# Patient Record
(demographics unavailable — no encounter records)

---

## 2024-11-20 NOTE — HISTORY OF PRESENT ILLNESS
[No Pertinent Cardiac History] : no history of aortic stenosis, atrial fibrillation, coronary artery disease, recent myocardial infarction, or implantable device/pacemaker [Atrial Fibrillation] : atrial fibrillation [Coronary Artery Disease] : coronary artery disease [Recent Myocardial Infarction] : recent myocardial infarction [Implantable Device/Pacemaker] : no implantable device/pacemaker [Asthma] : asthma [COPD] : no COPD [Sleep Apnea] : no sleep apnea [Smoker] : not a smoker [No Adverse Anesthesia Reaction] : no adverse anesthesia reaction in self or family member [Family Member] : no family member with adverse anesthesia reaction/sudden death [Self] : no previous adverse anesthesia reaction [Chronic Anticoagulation] : chronic anticoagulation [Diabetes] : diabetes [(Patient denies any chest pain, claudication, dyspnea on exertion, orthopnea, palpitations or syncope)] : Patient denies any chest pain, claudication, dyspnea on exertion, orthopnea, palpitations or syncope [Moderate (4-6 METs)] : Moderate (4-6 METs) [FreeTextEntry1] : l4-l5  laminectomy and fusion  [FreeTextEntry2] : 11/26/2024 [FreeTextEntry4] : 57 year  old female presents for pre op clearance- moderate- severe stenosis l4-l5  PMH: antibody mediated autoimmune hemolytic anemia. - sees hematology  hypothyroid, cardiac stent, MI CAD, DM 2, hypotension CAD - stent- hx of afib, ,  PSH:  S/p surgery for carpal tunnel and lysis of scar tissue 3/29/24 c sect,  hx of VTE  apl Allergies: CODEINE, LATEX, VIT e, BACITRACIN NEOSPORIN, metal Meds:  Prednisone 5mg and azathioprine 50mg once a day crestor, hs, lt4, asa  bactrum, - ID azathio,  asa  eliquis metorplol  bid     folic acid  probiotic  prevagen  calcium  d3  multiv  azelastine  flonase  snoring

## 2025-02-13 NOTE — HISTORY OF PRESENT ILLNESS
[de-identified] : 57 year old female with PMH of hypothyroidism, HTN, DM type 2, hemolytic anemia, CAD s/p stent x1, DVT (2022), and pAF s/p L3-L5 posterior spinal decompression and fusion 11/26 c/b complete occlusive thrombosis of the left common iliac vein and associate compartment syndrome. s/p RTOR for LLE thrombectomy, IVC filter placement, L thigh and calf fasciotomies on 11/27 further c/b intraoperative cardiac arrest requiring multiple rounds of CPR from presumed PE. Additional code 11/28 requiring 2 rounds of CPR and push of bicarb + mg. Patient with LLE NV exam changes overnight iso increased leg swelling w/ drainage, rising lactate, and increasing pressor requirements c/f sepsis 12/13. Patient brought emergently to OR now s/p open L AKA w/ wound vac placement 12/14. Now s/p tracheostomy, but course c/b epidural hematoma pending OR washout 1/8. Hematology reconsulted for AC recs.   #Warm AIHA #DVT/PE Pt's warm autoimmune hemolytic anemia (Dr. Gillespie) has been refractory to multiple therapies outpatient, including prednisone, rituximab, splenectomy, imuran + prednisone. Most recently, outpatient regimen consisted of prednisone 5mg (increased to 10mg daily for her upcoming spine surgery) and azathioprine 50mg daily. Hypercoagulable workup will have to be repeated outpatient to accurately diagnose an underlying hypercoagulable state. - Negative Silica test, beta 2 glycoprotein, cardiolipin - DRVVT +ve (1.49). Lupus anticoagulation test (silica and DRVVT) can be affected by anticoagulation. Patient was started on Heparin drip around 5 pm on 11/27/24 and sample for DRVVT was collected around 5 am on 11/28/24.   #Supratherapeutic INR - Started bridge to warfarin given +DRVVT (although may be unreliable in setting of heparin infusoin when lab was collected) - Warfarin held since 12/6 PM given suprathereapeutic INR which fredrick to peak 3.97 (12/10) - s/p Vit K 10mg x2 in SICU with improvement in INR, s/p trach and peg 12/16/2024  - Per neurology: near complete quadriplegia post multiple cardiac arrests and sedation with intact cognition, possibly due to myelopathy, concern for spinal infarct.  - MR showing patchy foci of increased intensity in cervical spine concerning for infarction as well as long segment posterior epidural hematoma extending from C5 to L3 with mass effect on posterior thecal sac.   Recommendations: - currently on therapeutic lovenox. Once no further procedures planned d/w patient's primary hematologist can transition the patient to a DOAC as patient does not carry a diagnosis of APLS at this time.  - pRBC transfusions as necessary to maintain Hb>7 - Recommend platelet goal of > while critically bleeding, otherwise 50 - S/p stress-dose steroids for critical illness, and now transitioned back to home prednisone 10mg daily - Will hold her home azathioprine (Imuran) 50mg daily. Will discuss on discharge, if plan to resume.  - Will repeat hypercoag workup outpatient, patient to follow up with Dr. Gillespie (primary hematologist) outpatient on discharge. -Please start folic acid 1 mg daily as supportive measure i/s/o hx of hemolysis.

## 2025-07-25 NOTE — PHYSICAL EXAM
[2+] : right 2+ [Ankle Swelling (On Exam)] : present [Ankle Swelling On The Right] : mild [Skin Ulcer] : ulcer [Alert] : alert [Oriented to Person] : oriented to person [Oriented to Place] : oriented to place [Oriented to Time] : oriented to time [Calm] : calm [Please See PDF for Tissue Analytics] : Please See PDF for Tissue Analytics. [de-identified] : Lethargic, alert, well groomed [de-identified] : soft, non tender [de-identified] : Left BKA [de-identified] : multiple

## 2025-07-25 NOTE — PHYSICAL EXAM
[2+] : right 2+ [Ankle Swelling (On Exam)] : present [Ankle Swelling On The Right] : mild [Skin Ulcer] : ulcer [Alert] : alert [Oriented to Person] : oriented to person [Oriented to Place] : oriented to place [Oriented to Time] : oriented to time [Calm] : calm [Please See PDF for Tissue Analytics] : Please See PDF for Tissue Analytics. [de-identified] : Lethargic, alert, well groomed [de-identified] : soft, non tender [de-identified] : Left BKA [de-identified] : multiple

## 2025-07-25 NOTE — HISTORY OF PRESENT ILLNESS
[FreeTextEntry1] : Pt. presents today with her son, pt. went in for elective lumbar laminectomy in november, 2024 at [facility], surgery went well, then post -op her left leg started swelling, compartment syndrome, DVT, s/p fasciotomy, attempted to do thrombectomy, coded 2x on table,, found to have PE, IVC  filter placed,  transferred to ICU, vac was placed, for a whole night vac was not holding pressure, following morning, vac removed, wound found to be black, for possible debridement, unfortunately s/p Left AKA was performed. Was hospitalized from November 2024-Jan. 2025. Then transferred to [facility] rehab. stopped her blood thinners, subsequent right DVT, back to [facility], was found to be in kidney failure. While at [facility] - septic, acute renal failure, developed worsening of sacral wound, worsening of right leg, s/p excisional debridement at bedside by Dr Luz of right posterior calf. Wounds - sacral stage 4, wound bed - areas of pink tissue, areas of slough, necrotic edges, wound injected with lidocaine/epi. before excisional debridement of slough and necrotic tissue, tolerated well. Left BKA site and lateral aspect thigh - clean and pink, wound pulled to surface. Right posterior calf (fasciotomy site) clean, pink, exposed tendon, remains moist. Right heel with 2 wounds, s/p excisional debridement of yellow slough and necrotic scab. right posterior thigh - clean and pink right dorsal foot with irritation most loikely from strap on heel pillow

## 2025-07-25 NOTE — ASSESSMENT
[Verbal] : Verbal [Written] : Written [Patient] : Patient [Good - alert, interested, motivated] : Good - alert, interested, motivated [Verbalizes knowledge/Understanding] : Verbalizes knowledge/understanding [Dressing changes] : dressing changes [Skin Care] : skin care [Pressure relief] : pressure relief [Signs and symptoms of infection] : sign and symptoms of infection [Nutrition] : nutrition [How and When to Call] : how and when to call [Patient responsibility to plan of care] : patient responsibility to plan of care [FreeTextEntry1] : Pt. presents today with her son, pt. went in for elective lumbar laminectomy in november, 2024 at [facility], surgery went well, then post -op her left leg started swelling, compartment syndrome, DVT, s/p fasciotomy, attempted to do thrombectomy, coded 2x on table,, found to have PE, IVC  filter placed,  transferred to ICU, vac was placed, for a whole night vac was not holding pressure, following morning, vac removed, wound found to be black, for possible debridement, unfortunately s/p Left AKA was performed. Was hospitalized from November 2024-Jan. 2025. Then transferred to [facility] rehab. stopped her blood thinners, subsequent right DVT, back to [facility], was found to be in kidney failure. While at [facility] - septic, acute renal failure, developed worsening of sacral wound, worsening of right leg, s/p excisional debridement at bedside by Dr Luz of right posterior calf. Wounds - sacral stage 4, wound bed - areas of pink tissue, areas of slough, necrotic edges, wound injected with lidocaine/epi. before excisional debridement of slough and necrotic tissue, tolerated well. Left BKA site and lateral aspect thigh - clean and pink, wound pulled to surface. Right posterior calf (fasciotomy site) clean, pink, exposed tendon, remains moist. Right heel with 2 wounds, s/p excisional debridement of yellow slough and necrotic scab. right posterior thigh - clean and pink right dorsal foot with irritation most loikely from strap on heel pillow

## 2025-07-25 NOTE — PLAN
[FreeTextEntry1] : 7/23/25 Plan - orders for facility  Right thigh medial aspect, warm, indurated, wrote for facility to monitor offloading with group 2 mattress stephanie 2 packages/day sacral - wash soap and water, wash with dakins, start wound vac, 125 mm/hg, continuous with black foam and bridging Left BKA site- wash with soap and water, rinse, adaptic/aquacel/ben ace, change daily right posterior calf - wash with dakins, exposed tendon - apply hydrogel/adaptic/aquacel/ben, change daily right heel wounds - wash with dakins, aquacel/4x4/cover, change daily, heel pillow right posterior thigh - clean with dakins, aquacel, cover, change daily right foot (dorsal) cavilon daily, dry protective dressing, do not pull starp too tight on heel pillow follow up 2 weeks

## 2025-07-25 NOTE — HISTORY OF PRESENT ILLNESS
[FreeTextEntry1] : Pt. presents today with her son, pt. went in for elective lumbar laminectomy in november, 2024 at [facility], surgery went well, then post -op her left leg started swelling, compartment syndrome, DVT, s/p fasciotomy, attempted to do thrombectomy, coded 2x on table,, found to have PE, IVC  filter placed,  transferred to ICU, vac was placed, for a whole night vac was not holding pressure, following morning, vac removed, wound found to be black, for possible debridement, unfortunately s/p Left AKA was performed. Was hospitalized from November 2024-Jan. 2025. Then transferred to [facility] rehab. stopped her blood thinners, subsequent right DVT, back to [facility], was found to be in kidney failure. While at [facility] - septic, acute renal failure, developed worsening of sacral wound, worsening of right leg, s/p excisional debridement at bedside by Dr Luz of right posterior calf. Wounds - sacral stage 4, wound bed - areas of pink tissue, areas of slough, necrotic edges, wound injected with lidocaine/epi. before excisional debridement of slough and necrotic tissue, tolerated well. Left BKA site and lateral aspect thigh - clean and pink, wound pulled to surface. Right posterior calf (fasciotomy site) clean, pink, exposed tendon, remains moist. Right heel with 2 wounds, s/p excisional debridement of yellow slough and necrotic scab. right posterior thigh - clean and pink right dorsal foot with irritation most loikely from strap on heel pillow

## 2025-07-25 NOTE — REVIEW OF SYSTEMS
[Feeling Poorly] : feeling poorly [Incontinence] : incontinence [Skin Wound] : skin wound [Negative] : Psychiatric [Feeling Tired] : not feeling tired [FreeTextEntry9] : left BKA [de-identified] : multiple wounds [de-identified] : diabetes induced by prednisone

## 2025-07-25 NOTE — REVIEW OF SYSTEMS
[Feeling Poorly] : feeling poorly [Incontinence] : incontinence [Skin Wound] : skin wound [Negative] : Psychiatric [Feeling Tired] : not feeling tired [FreeTextEntry9] : left BKA [de-identified] : multiple wounds [de-identified] : diabetes induced by prednisone

## 2025-07-25 NOTE — ASSESSMENT
[Verbal] : Verbal [Written] : Written [Patient] : Patient [Good - alert, interested, motivated] : Good - alert, interested, motivated [Verbalizes knowledge/Understanding] : Verbalizes knowledge/understanding [Dressing changes] : dressing changes [Skin Care] : skin care [Pressure relief] : pressure relief [Signs and symptoms of infection] : sign and symptoms of infection [Nutrition] : nutrition [How and When to Call] : how and when to call [Patient responsibility to plan of care] : patient responsibility to plan of care [FreeTextEntry1] : Pt. presents today with her son, pt. went in for elective lumbar laminectomy in november, 2024 at [facility], surgery went well, then post -op her left leg started swelling, compartment syndrome, DVT, s/p fasciotomy, attempted to do thrombectomy, coded 2x on table,, found to have PE, IVC  filter placed,  transferred to ICU, vac was placed, for a whole night vac was not holding pressure, following morning, vac removed, wound found to be black, for possible debridement, unfortunately s/p Left AKA was performed. Was hospitalized from November 2024-Jan. 2025. Then transferred to [facility] rehab. stopped her blood thinners, subsequent right DVT, back to [facility], was found to be in kidney failure. While at [facility] - septic, acute renal failure, developed worsening of sacral wound, worsening of right leg, s/p excisional debridement at bedside by Dr Luz of right posterior calf. Wounds - sacral stage 4, wound bed - areas of pink tissue, areas of slough, necrotic edges, wound injected with lidocaine/epi. before excisional debridement of slough and necrotic tissue, tolerated well. Left BKA site and lateral aspect thigh - clean and pink, wound pulled to surface. Right posterior calf (fasciotomy site) clean, pink, exposed tendon, remains moist. Right heel with 2 wounds, s/p excisional debridement of yellow slough and necrotic scab. right posterior thigh - clean and pink right dorsal foot with irritation most loikely from strap on heel pillow

## 2025-07-28 NOTE — DISCUSSION/SUMMARY
[de-identified] : S/P L3-5 fusion in Nov 2024 with me. Hospital course c/b by LLE DVT and compartment syndrome, s/p left AKA.  Her x-rays look adequate.  I did review the preoperative x-rays which show the spondylolisthesis from L3-L5 with her son.  I also reviewed the preoperative MRI that showed the severe spinal stenosis at L3-L5. At this point she is at rehabilitation she is working with occupational therapy daily I speak to her weekly.  She is getting her wounds taken care of which is the decubiti and she has an appointment to see the vascular surgeon next week which is Dr. Luz. They would like to move to [facility] or [place] rehabilitation which I agree with.  The ultimate goal is to get her wheelchair-bound. Again the son understands that her mother does suffer from underlying clotting disorder that led to her preoperative myocardial infarction prior to undergoing surgery and her deep vein thrombosis postop day 1 after surgery.  Again reiterated to him that we do not start anticoagulation the morning after surgery and fear of her developing a spinal hematoma and neural compression which she actually did days later following anticoagulation for her deep vein thrombosis.  He also understands that on postoperative day 1 she had complete resolution of her preoperative leg pain and did well with physical therapy I mention to him that I did not use TXA during the surgery due to her clotting disorder and he appreciates everything that was done.  Since she ambulated on postop day 1 we will start anticoagulation at that evening on postop day 2 but unfortunately she developed a deep vein thrombosis in the morning after her surgery.  At this point she will continue at the rehabilitation with the goal of eventually being wheelchair-bound.  The son and the patient are very grateful all the great care and realize that she has an underlying clotting disorder that led to most of her issues including a deep vein thrombosis in her left lower extremity, left upper extremity and right lower extremity. They will follow-up in January for an x-ray examination I will be in contact with me weekly as usual. All questions were answered, all alternatives discussed and the patient is in complete agreement with the treatment plan which the patient contributed to and discussed with me through the shared decision making process. Follow-up appointment as instructed. Any issues and the patient will call or come in sooner.

## 2025-07-28 NOTE — PHYSICAL EXAM
[de-identified] : 5 out of 5 motor strength, sensation is intact in her upper extremities.  No apparent distress. Alert and oriented x3 and normal mood.  She is a little bit sleepy today in the office. [de-identified] : Lateral x-ray lumbar is very hard to read as she is in a stretcher but does show the hardware from L3-L5 there is no fractures of the screws they do not appear to be changed in position.

## 2025-07-28 NOTE — HISTORY OF PRESENT ILLNESS
[Stable] : stable [de-identified] : 58 year old female presents for follow up.  S/P L3-5 fusion in Nov 2024 with me. Hospital course c/b by LLE DVT and compartment syndrome, s/p left AKA.  She is currently in a rehab.  Denies any back pain or leg pain currently. She is wheelchair/stretcher bound.  PMHx: currently on and eliquis, HTN, DM on insulin, hemolytic anemia, levothyroxine. S/P splenectomy. No fever chills sweats nausea vomiting no bowel or bladder dysfunction, no recent weight loss or gain no night pain. This history is in addition to the intake form that I personally reviewed.